# Patient Record
Sex: MALE | Race: WHITE | NOT HISPANIC OR LATINO | Employment: FULL TIME | URBAN - METROPOLITAN AREA
[De-identification: names, ages, dates, MRNs, and addresses within clinical notes are randomized per-mention and may not be internally consistent; named-entity substitution may affect disease eponyms.]

---

## 2022-09-28 ENCOUNTER — TRANSCRIBE ORDERS (OUTPATIENT)
Dept: ADMINISTRATIVE | Facility: HOSPITAL | Age: 57
End: 2022-09-28

## 2022-09-28 ENCOUNTER — HOSPITAL ENCOUNTER (OUTPATIENT)
Dept: GENERAL RADIOLOGY | Facility: HOSPITAL | Age: 57
Discharge: HOME | End: 2022-09-28
Admitting: INTERNAL MEDICINE

## 2022-09-28 DIAGNOSIS — G89.29 CHRONIC NECK PAIN: Primary | ICD-10-CM

## 2022-09-28 DIAGNOSIS — M54.2 CHRONIC NECK PAIN: ICD-10-CM

## 2022-09-28 DIAGNOSIS — G89.29 CHRONIC NECK PAIN: ICD-10-CM

## 2022-09-28 DIAGNOSIS — M54.2 CHRONIC NECK PAIN: Primary | ICD-10-CM

## 2022-09-28 PROCEDURE — 72050 X-RAY EXAM NECK SPINE 4/5VWS: CPT

## 2022-09-30 ENCOUNTER — TRANSCRIBE ORDERS (OUTPATIENT)
Dept: ADMINISTRATIVE | Facility: HOSPITAL | Age: 57
End: 2022-09-30

## 2022-09-30 DIAGNOSIS — M54.2 NECK PAIN: ICD-10-CM

## 2022-09-30 DIAGNOSIS — Z12.11 COLON CANCER SCREENING: Primary | ICD-10-CM

## 2022-11-08 ENCOUNTER — APPOINTMENT (OUTPATIENT)
Dept: MRI IMAGING | Facility: HOSPITAL | Age: 57
End: 2022-11-08

## 2023-03-10 ENCOUNTER — HOSPITAL ENCOUNTER (OUTPATIENT)
Dept: MRI IMAGING | Facility: HOSPITAL | Age: 58
Discharge: HOME | End: 2023-03-10
Admitting: INTERNAL MEDICINE
Payer: COMMERCIAL

## 2023-03-10 DIAGNOSIS — M54.2 NECK PAIN: ICD-10-CM

## 2023-03-10 DIAGNOSIS — Z12.11 COLON CANCER SCREENING: ICD-10-CM

## 2023-03-10 PROCEDURE — 72141 MRI NECK SPINE W/O DYE: CPT

## 2023-04-04 ENCOUNTER — OFFICE VISIT (OUTPATIENT)
Dept: NEUROSURGERY | Facility: CLINIC | Age: 58
End: 2023-04-04
Payer: COMMERCIAL

## 2023-04-04 VITALS — BODY MASS INDEX: 29.71 KG/M2 | TEMPERATURE: 97.3 F | WEIGHT: 200.6 LBS | HEIGHT: 69 IN

## 2023-04-04 DIAGNOSIS — M47.812 CERVICAL SPONDYLOSIS: ICD-10-CM

## 2023-04-04 DIAGNOSIS — M50.30 DDD (DEGENERATIVE DISC DISEASE), CERVICAL: ICD-10-CM

## 2023-04-04 DIAGNOSIS — M54.2 NECK PAIN: Primary | ICD-10-CM

## 2023-04-04 PROCEDURE — 99203 OFFICE O/P NEW LOW 30 MIN: CPT | Performed by: NEUROLOGICAL SURGERY

## 2023-04-04 RX ORDER — FLUTICASONE PROPIONATE 50 MCG
SPRAY, SUSPENSION (ML) NASAL
COMMUNITY

## 2023-04-04 RX ORDER — ALENDRONATE SODIUM 70 MG/1
TABLET ORAL
COMMUNITY
Start: 2023-02-15

## 2023-04-04 NOTE — PROGRESS NOTES
Patient: Rigoberto Fofana  : 1965    Primary Care Provider: Mine Negron MD    Requesting Provider: As above        History    Chief Complaint: Chronic neck pain.    History of Present Illness: Mr. Fofana is a 57-year-old  who has had some neck problems dating back to  or so.  He denies any inciting or precipitating events.  He has associated suboccipital headache.  Headaches will awaken him at night.  Occasionally has some numbness in his left elbow region and left hand.  He has no radicular arm pain.  He is done chiropractic and physical therapy.  He has used over-the-counter medicines as well.  He is worse with protracted sitting.  He has established a fairly ergonomic workstation.    Review of Systems   Constitutional: Positive for activity change. Negative for appetite change, chills, diaphoresis, fatigue, fever and unexpected weight change.   HENT: Positive for dental problem and sore throat. Negative for congestion, drooling, ear discharge, ear pain, facial swelling, hearing loss, mouth sores, nosebleeds, postnasal drip, rhinorrhea, sinus pressure, sinus pain, sneezing, tinnitus, trouble swallowing and voice change.    Eyes: Negative for photophobia, pain, discharge, redness, itching and visual disturbance.   Respiratory: Negative for apnea, cough, choking, chest tightness, shortness of breath, wheezing and stridor.    Cardiovascular: Positive for palpitations. Negative for chest pain and leg swelling.   Gastrointestinal: Positive for abdominal pain. Negative for abdominal distention, anal bleeding, blood in stool, constipation, diarrhea, nausea, rectal pain and vomiting.   Endocrine: Negative for cold intolerance, heat intolerance, polydipsia, polyphagia and polyuria.   Genitourinary: Positive for difficulty urinating. Negative for decreased urine volume, dysuria, enuresis, flank pain, frequency, genital sores, hematuria, penile discharge, penile pain, penile swelling, scrotal  "swelling, testicular pain and urgency.   Musculoskeletal: Positive for back pain, neck pain and neck stiffness. Negative for arthralgias, gait problem, joint swelling and myalgias.   Skin: Negative for color change, pallor, rash and wound.   Allergic/Immunologic: Positive for environmental allergies. Negative for food allergies and immunocompromised state.   Neurological: Positive for headaches. Negative for dizziness, tremors, seizures, syncope, facial asymmetry, speech difficulty, weakness, light-headedness and numbness.   Hematological: Negative for adenopathy. Does not bruise/bleed easily.   Psychiatric/Behavioral: Positive for sleep disturbance. Negative for agitation, behavioral problems, confusion, decreased concentration, dysphoric mood, hallucinations, self-injury and suicidal ideas. The patient is not nervous/anxious and is not hyperactive.        The patient's past medical history, past surgical history, family history, and social history have been reviewed at length in the electronic medical record.      Physical Exam:   Temp 97.3 °F (36.3 °C) (Infrared)   Ht 175.3 cm (69\")   Wt 91 kg (200 lb 9.6 oz)   BMI 29.62 kg/m²   CONSTITUTIONAL: Patient is well-nourished, pleasant and appears stated age.  MUSCULOSKELETAL:  Neck tenderness to palpation is not observed.   ROM in the neck is normal.  NEUROLOGICAL:  Orientation, memory, attention span, language function, and cognition have been examined and are intact.  Strength is intact in the upper and lower extremities to direct testing.  Muscle tone is normal throughout.  Station and gait are normal.  Sensation is intact to light touch testing throughout.  Deep tendon reflexes are 2+ and symmetrical.  Donnie's Sign is negative bilaterally. No clonus is elicited.  Coordination is intact.      Medical Decision Making    Data Review:   (All imaging studies were personally reviewed unless stated otherwise)  MRI of the cervical spine dated 3/10/2023 demonstrates " reversal of the normal lordosis with some diffuse degenerative disc disease.  There are some broad-based spurring at a number of levels.  There is no cord compromise.    Diagnosis:   The patient harbors predominant mechanical neck pain.  Occasionally has some subtle radicular symptoms.    Treatment Options:   I have recommended ongoing symptomatic treatment.  If his symptoms become onerous then a referral to a pain clinic setting for some facet blocks would be a consideration.  He will follow-up with neurosurgery on an as-needed basis.       Diagnosis Plan   1. Neck pain        2. Cervical spondylosis        3. DDD (degenerative disc disease), cervical            Scribed for Imtiaz Ponce MD by RENA Ingram 4/4/2023 15:12 EDT      I, Dr. Ponce, personally performed the services described in the documentation, as scribed in my presence, and it is both accurate and complete.

## 2024-10-18 ENCOUNTER — LAB (OUTPATIENT)
Facility: HOSPITAL | Age: 59
End: 2024-10-18
Payer: COMMERCIAL

## 2024-10-18 ENCOUNTER — TRANSCRIBE ORDERS (OUTPATIENT)
Facility: HOSPITAL | Age: 59
End: 2024-10-18
Payer: COMMERCIAL

## 2024-10-18 DIAGNOSIS — M81.0 SENILE OSTEOPOROSIS: ICD-10-CM

## 2024-10-18 DIAGNOSIS — M81.0 SENILE OSTEOPOROSIS: Primary | ICD-10-CM

## 2024-10-18 LAB
ALBUMIN SERPL-MCNC: 4.5 G/DL (ref 3.5–5.2)
ANION GAP SERPL CALCULATED.3IONS-SCNC: 10 MMOL/L (ref 5–15)
BUN SERPL-MCNC: 14 MG/DL (ref 6–20)
BUN/CREAT SERPL: 14.9 (ref 7–25)
CALCIUM SPEC-SCNC: 9.5 MG/DL (ref 8.6–10.5)
CHLORIDE SERPL-SCNC: 104 MMOL/L (ref 98–107)
CO2 SERPL-SCNC: 27 MMOL/L (ref 22–29)
CREAT SERPL-MCNC: 0.94 MG/DL (ref 0.76–1.27)
EGFRCR SERPLBLD CKD-EPI 2021: 94.6 ML/MIN/1.73
GLUCOSE SERPL-MCNC: 59 MG/DL (ref 65–99)
PHOSPHATE SERPL-MCNC: 3.7 MG/DL (ref 2.5–4.5)
POTASSIUM SERPL-SCNC: 4 MMOL/L (ref 3.5–5.2)
SODIUM SERPL-SCNC: 141 MMOL/L (ref 136–145)

## 2024-10-18 PROCEDURE — 80069 RENAL FUNCTION PANEL: CPT

## 2024-10-18 PROCEDURE — 82523 COLLAGEN CROSSLINKS: CPT

## 2024-10-18 PROCEDURE — 36415 COLL VENOUS BLD VENIPUNCTURE: CPT

## 2024-10-18 PROCEDURE — 82306 VITAMIN D 25 HYDROXY: CPT

## 2024-10-18 PROCEDURE — 84080 ASSAY ALKALINE PHOSPHATASES: CPT

## 2024-10-19 LAB — 25(OH)D3 SERPL-MCNC: 34.8 NG/ML (ref 30–100)

## 2024-10-23 LAB — ALP BONE SERPL-MCNC: 9.6 UG/L (ref 7.5–25.7)

## 2024-10-27 LAB — COLLAGEN CTX SERPL-MCNC: 153 PG/ML
